# Patient Record
Sex: FEMALE
[De-identification: names, ages, dates, MRNs, and addresses within clinical notes are randomized per-mention and may not be internally consistent; named-entity substitution may affect disease eponyms.]

---

## 2019-04-03 ENCOUNTER — HOSPITAL ENCOUNTER (INPATIENT)
Dept: HOSPITAL 72 - SDSOVERFLO | Age: 69
LOS: 2 days | Discharge: HOME HEALTH | DRG: 748 | End: 2019-04-05
Payer: MEDICARE

## 2019-04-03 VITALS — DIASTOLIC BLOOD PRESSURE: 68 MMHG | SYSTOLIC BLOOD PRESSURE: 131 MMHG

## 2019-04-03 VITALS — DIASTOLIC BLOOD PRESSURE: 59 MMHG | SYSTOLIC BLOOD PRESSURE: 129 MMHG

## 2019-04-03 VITALS — SYSTOLIC BLOOD PRESSURE: 147 MMHG | DIASTOLIC BLOOD PRESSURE: 65 MMHG

## 2019-04-03 VITALS — SYSTOLIC BLOOD PRESSURE: 132 MMHG | DIASTOLIC BLOOD PRESSURE: 65 MMHG

## 2019-04-03 VITALS — DIASTOLIC BLOOD PRESSURE: 65 MMHG | SYSTOLIC BLOOD PRESSURE: 143 MMHG

## 2019-04-03 VITALS — SYSTOLIC BLOOD PRESSURE: 134 MMHG | DIASTOLIC BLOOD PRESSURE: 59 MMHG

## 2019-04-03 VITALS — DIASTOLIC BLOOD PRESSURE: 82 MMHG | SYSTOLIC BLOOD PRESSURE: 138 MMHG

## 2019-04-03 VITALS — DIASTOLIC BLOOD PRESSURE: 61 MMHG | SYSTOLIC BLOOD PRESSURE: 138 MMHG

## 2019-04-03 VITALS — DIASTOLIC BLOOD PRESSURE: 63 MMHG | SYSTOLIC BLOOD PRESSURE: 133 MMHG

## 2019-04-03 VITALS — BODY MASS INDEX: 29.88 KG/M2 | WEIGHT: 175 LBS | HEIGHT: 64 IN

## 2019-04-03 VITALS — SYSTOLIC BLOOD PRESSURE: 137 MMHG | DIASTOLIC BLOOD PRESSURE: 65 MMHG

## 2019-04-03 VITALS — SYSTOLIC BLOOD PRESSURE: 101 MMHG | DIASTOLIC BLOOD PRESSURE: 70 MMHG

## 2019-04-03 VITALS — SYSTOLIC BLOOD PRESSURE: 116 MMHG | DIASTOLIC BLOOD PRESSURE: 55 MMHG

## 2019-04-03 VITALS — SYSTOLIC BLOOD PRESSURE: 146 MMHG | DIASTOLIC BLOOD PRESSURE: 64 MMHG

## 2019-04-03 DIAGNOSIS — M79.7: ICD-10-CM

## 2019-04-03 DIAGNOSIS — M19.90: ICD-10-CM

## 2019-04-03 DIAGNOSIS — F32.9: ICD-10-CM

## 2019-04-03 DIAGNOSIS — I10: ICD-10-CM

## 2019-04-03 DIAGNOSIS — E03.9: ICD-10-CM

## 2019-04-03 DIAGNOSIS — E78.5: ICD-10-CM

## 2019-04-03 DIAGNOSIS — N81.10: Primary | ICD-10-CM

## 2019-04-03 DIAGNOSIS — N39.46: ICD-10-CM

## 2019-04-03 DIAGNOSIS — N81.6: ICD-10-CM

## 2019-04-03 LAB
ADD MANUAL DIFF: NO
ANION GAP SERPL CALC-SCNC: 8 MMOL/L (ref 5–15)
BASOPHILS NFR BLD AUTO: 0.8 % (ref 0–2)
BUN SERPL-MCNC: 15 MG/DL (ref 7–18)
CALCIUM SERPL-MCNC: 9.1 MG/DL (ref 8.5–10.1)
CHLORIDE SERPL-SCNC: 108 MMOL/L (ref 98–107)
CO2 SERPL-SCNC: 29 MMOL/L (ref 21–32)
CREAT SERPL-MCNC: 0.7 MG/DL (ref 0.55–1.3)
EOSINOPHIL NFR BLD AUTO: 1 % (ref 0–3)
ERYTHROCYTE [DISTWIDTH] IN BLOOD BY AUTOMATED COUNT: 11.6 % (ref 11.6–14.8)
HCT VFR BLD CALC: 37.2 % (ref 37–47)
HGB BLD-MCNC: 12.4 G/DL (ref 12–16)
LYMPHOCYTES NFR BLD AUTO: 25.8 % (ref 20–45)
MCV RBC AUTO: 92 FL (ref 80–99)
MONOCYTES NFR BLD AUTO: 7.8 % (ref 1–10)
NEUTROPHILS NFR BLD AUTO: 64.6 % (ref 45–75)
PLATELET # BLD: 218 K/UL (ref 150–450)
POTASSIUM SERPL-SCNC: 3.5 MMOL/L (ref 3.5–5.1)
RBC # BLD AUTO: 4.06 M/UL (ref 4.2–5.4)
SODIUM SERPL-SCNC: 145 MMOL/L (ref 136–145)
WBC # BLD AUTO: 6.9 K/UL (ref 4.8–10.8)

## 2019-04-03 PROCEDURE — 80048 BASIC METABOLIC PNL TOTAL CA: CPT

## 2019-04-03 PROCEDURE — 0JQC0ZZ REPAIR PELVIC REGION SUBCUTANEOUS TISSUE AND FASCIA, OPEN APPROACH: ICD-10-PCS

## 2019-04-03 PROCEDURE — 85025 COMPLETE CBC W/AUTO DIFF WBC: CPT

## 2019-04-03 PROCEDURE — 94003 VENT MGMT INPAT SUBQ DAY: CPT

## 2019-04-03 PROCEDURE — 36415 COLL VENOUS BLD VENIPUNCTURE: CPT

## 2019-04-03 PROCEDURE — 94150 VITAL CAPACITY TEST: CPT

## 2019-04-03 PROCEDURE — 87081 CULTURE SCREEN ONLY: CPT

## 2019-04-03 PROCEDURE — 0TSC0ZZ REPOSITION BLADDER NECK, OPEN APPROACH: ICD-10-PCS

## 2019-04-03 PROCEDURE — 0JUC0JZ SUPPLEMENT OF PELVIC REGION SUBCUTANEOUS TISSUE AND FASCIA WITH SYNTHETIC SUBSTITUTE, OPEN APPROACH: ICD-10-PCS

## 2019-04-03 PROCEDURE — 0USG0ZZ REPOSITION VAGINA, OPEN APPROACH: ICD-10-PCS

## 2019-04-03 RX ADMIN — DEXTROSE, SODIUM CHLORIDE, AND POTASSIUM CHLORIDE SCH MLS/HR: 5; .45; .15 INJECTION INTRAVENOUS at 16:13

## 2019-04-03 NOTE — PRE-PROCEDURE NOTE/ATTESTATION
Pre-Procedure Note/Attestation


Complete Prior to Procedure


Planned Procedure:  not applicable


Procedure Narrative:


cystocele rectocele repair vaginal sling cystoscopy





Indications for Procedure


Pre-Operative Diagnosis:


prolapse





Attestation


I attest that I discussed the nature of the procedure; its benefits; risks and 

complications; and alternatives (and the risks and benefits of such alternatives

), prior to the procedure, with the patient (or the patient's legal 

representative).





I attest that, if there was a reasonable possibility of needing a blood 

transfusion, the patient (or the patient's legal representative) was given the 

[facility] standardized written summary, pursuant 

to the Jose Roberto Jennifer Blood Safety Act ([place] Health and Safety Code # 1645, as 

amended).





I attest that I re-evaluated the patient just prior to the surgery and that 

there has been no change in the patient's H&P, except as documented below:











Angel Oneal MD Apr 3, 2019 11:11

## 2019-04-03 NOTE — ANETHESIA PREOPERATIVE EVAL
Anesthesia Pre-op PMH/ROS


General


Date of Evaluation:  Apr 3, 2019


Anesthesiologist:  [place]


ASA Score:  ASA 3


Mallampati Score


Class I : Soft palate, uvula, fauces, pillars visible


Class II: Soft palate, uvula, fauces visible


Class III: Soft palate, base of uvula visible


Class IV: Only hard plate visible


Mallampati Classification:  Class II


Surgeon:  Kimmy


Diagnosis:  Bladder prolapse


Surgical Procedure:  Cystoscopy, cystocele, vaginal sling


Anesthesia History:  none


Family History:  no anesthesia problems


Allergies:  


Coded Allergies:  


     PENICILLINS (Verified  Allergy, Intermediate, swelling; throat closes- up, 

4/2/19)


Medications:  see eMAR


Patient NPO?:  Yes


NPO Date:  Apr 2, 2019


NPO Time:  2000





Past Medical History


Cardiovascular:  Reports: HTN, arrhythmia, other - HLD; 


   Denies: CAD, MI, valve dz


Pulmonary:  Denies: asthma, COPD, NICOLASA, other


Gastrointestinal/Genitourinary:  Reports: GERD, other - bladder prolapse; 


   Denies: CRI, ESRD


Neurologic/Psychiatric:  Reports: CVA; 


   Denies: dementia, depression/anxiety, TIA, other


Endocrine:  Reports: hypothyroidism; 


   Denies: DM, steroids, other


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 


   Denies: cataract (L), cataract (R), glaucoma, [place] (L), [place] (R)


Hematology/Immune:  Denies: anemia, DVT, bleeding disorder, other


Musculoskeletal/Integumentary:  Reports: OA; 


   Denies: RA, DJD, DDD, edema, other


Other:  obesity


PSxH Narrative:


lap appy





Anesthesia Pre-op Phys. Exam


Physician Exam





Last Vital Signs








  Date Time  Temp Pulse Resp B/P (MAP) Pulse Ox O2 Delivery O2 Flow Rate FiO2


 


4/3/19 09:30      Room Air  


 


4/3/19 09:01 98.7 64 18 138/82 (100) 99   








Constitutional:  NAD


Cardiovascular:  RRR


Respiratory:  CTA





Airway Exam


Mallampati Score:  Class II


MO:  full


ROM:  full


Dentures:  upper, lower





Anesthesia Pre-op A/P


Labs


see chart





Studies


Pre-op Studies:  EKG - sr





Risk Assessment & Plan


Assessment:


ASA III


Plan:


GA


Status Change Before Surgery:  No





Pre-Antibiotics


Drug:  Lizzy Man MD Apr 3, 2019 10:24

## 2019-04-03 NOTE — BRIEF OPERATIVE NOTE
Immediate Post Operative Note


Operative Note


Pre-op Diagnosis:


prolapse


Procedure:


cystocele and rectocele reapir vaginal sling cystoscopy


Post-op Diagnosis:


same


Post-op Diagnosis:  same as pre-op


Surgeon:  Alex Oneal


Anesthesia:  general


Specimen:  none


Complications:  none


Condition:  stable


Fluids:  1000


Estimated Blood Loss:  minimal


Drains:  none


Implant(s) used?:  No











Angel Oneal MD Apr 3, 2019 12:41

## 2019-04-03 NOTE — IMMEDIATE POST-OP EVALUATION
Immediate Post-Op Evalulation


Immediate Post-Op Evalulation


Procedure:  Vaginal sling, cystocele repair, rectocele repair, cystoscopy


Date of Evaluation:  Apr 3, 2019


Time of Evaluation:  12:48


IV Fluids:  800


Blood Products:  0


Estimated Blood Loss:  min


Urinary Output:  0


Blood Pressure Systolic:  147


Blood Pressure Diastolic:  65


Pulse Rate:  58


Respiratory Rate:  16


O2 Sat by Pulse Oximetry:  100


Temperature (Fahrenheit):  97.5


Pain Score (1-10):  0


Nausea:  No


Vomiting:  No


Complications


0


Patient Status:  awake, reacts, patent, none


Hydration Status:  adequate


Drug:  Clindamycin 600mg


Given Within 1 Hr of Incision:  Yes











Lizzy Dow MD Apr 3, 2019 12:47

## 2019-04-03 NOTE — NUR
NURSE NOTES:



Report taken from RAJANI Larson. Patient is awake and in bed, A&Ox4, family at bedside. No signs 
of distress on room air. IV site c/d/i and patent, D5 1/2 NS +20KCl at 100 ml/hr runnings, 
will discharge IV once patient able to intake 500cc fluids PO. MD left order to contact 
within certain parameters of BP, Temp, and UO, continue to monitor. No skin issues present. 
Surgical site dressing c/d/i, no excessive drainage. Urban c/d/i and flowing light blue 
urine, color due to fluid given during procedure. Bed in lowest position, call light within 
reach.

## 2019-04-03 NOTE — NUR
NURSE NOTES:

Received report from RAJANI Crocker. Pt in bed, resting, A/Ox4, no complaints of pain, assessment 
done, see flowsheet, no apparent bleeding noted, Urban in place and draining, vitals 
obtained and stable, will continue vitals per unit protocol for post-op pt. Family at 
bedside, bed in lowest position, call light within reach.

## 2019-04-04 VITALS — DIASTOLIC BLOOD PRESSURE: 54 MMHG | SYSTOLIC BLOOD PRESSURE: 105 MMHG

## 2019-04-04 VITALS — SYSTOLIC BLOOD PRESSURE: 109 MMHG | DIASTOLIC BLOOD PRESSURE: 49 MMHG

## 2019-04-04 VITALS — SYSTOLIC BLOOD PRESSURE: 109 MMHG | DIASTOLIC BLOOD PRESSURE: 70 MMHG

## 2019-04-04 VITALS — DIASTOLIC BLOOD PRESSURE: 46 MMHG | SYSTOLIC BLOOD PRESSURE: 87 MMHG

## 2019-04-04 VITALS — DIASTOLIC BLOOD PRESSURE: 48 MMHG | SYSTOLIC BLOOD PRESSURE: 101 MMHG

## 2019-04-04 VITALS — SYSTOLIC BLOOD PRESSURE: 107 MMHG | DIASTOLIC BLOOD PRESSURE: 72 MMHG

## 2019-04-04 LAB
ADD MANUAL DIFF: NO
ANION GAP SERPL CALC-SCNC: 8 MMOL/L (ref 5–15)
BASOPHILS NFR BLD AUTO: 0.4 % (ref 0–2)
BUN SERPL-MCNC: 10 MG/DL (ref 7–18)
CALCIUM SERPL-MCNC: 8.1 MG/DL (ref 8.5–10.1)
CHLORIDE SERPL-SCNC: 107 MMOL/L (ref 98–107)
CO2 SERPL-SCNC: 28 MMOL/L (ref 21–32)
CREAT SERPL-MCNC: 0.8 MG/DL (ref 0.55–1.3)
EOSINOPHIL NFR BLD AUTO: 0.4 % (ref 0–3)
ERYTHROCYTE [DISTWIDTH] IN BLOOD BY AUTOMATED COUNT: 11.7 % (ref 11.6–14.8)
HCT VFR BLD CALC: 30.1 % (ref 37–47)
HGB BLD-MCNC: 10 G/DL (ref 12–16)
LYMPHOCYTES NFR BLD AUTO: 17.4 % (ref 20–45)
MCV RBC AUTO: 92 FL (ref 80–99)
MONOCYTES NFR BLD AUTO: 9.4 % (ref 1–10)
NEUTROPHILS NFR BLD AUTO: 72.3 % (ref 45–75)
PLATELET # BLD: 193 K/UL (ref 150–450)
POTASSIUM SERPL-SCNC: 3.9 MMOL/L (ref 3.5–5.1)
RBC # BLD AUTO: 3.28 M/UL (ref 4.2–5.4)
SODIUM SERPL-SCNC: 143 MMOL/L (ref 136–145)
WBC # BLD AUTO: 8.2 K/UL (ref 4.8–10.8)

## 2019-04-04 RX ADMIN — DEXTROSE, SODIUM CHLORIDE, AND POTASSIUM CHLORIDE SCH MLS/HR: 5; .45; .15 INJECTION INTRAVENOUS at 02:11

## 2019-04-04 NOTE — NUR
HAND-OFF: 

Report given to RAJANI Beth. Surgical packing removed per MD order. Patient awake and stable.

## 2019-04-04 NOTE — NUR
NURSE NOTES:

During shift change patient alert awake with out no distress FC in place draining pale 
yellow urine, patient eating breakfast, bed on low position  locked, call light with in 
reach, SCD on, reported pain 4/10 in lower abdomen, patient also reported the pain was 
stronger but after the packing is removed pain decreased. will continue to monitor.

## 2019-04-04 NOTE — NUR
NURSE NOTES:

Pt. attempt to walk x3, once with PT , x2 with RN complained of light headedness and BP 
dropped when patient stood up MD notified discharge canceled, ordered medication refilled. 
encouraged fluid intake will continue to monitor.

## 2019-04-04 NOTE — NUR
**DISCHARGE PLANNING



PATIENT HAS BEEN REFERRED TO

[facility]

T: [phone]

F: [phone]

## 2019-04-04 NOTE — NUR
PT EVALUATION NOTE

Patient seen for initial evaluation,  please see evaluation for details. Patient presents 
with generalized weakness and impaired mobility. Patient will benefit from skilled inpatient 
PT intervention to address strength, balance, endurance, safety 

and functional mobility to return to prior level of function. Anticipate discharge home with 
family assistance once cleared by MD. Do not anticipate any DME needs. 


-------------------------------------------------------------------------------

Addendum: 04/04/19 at 1301 by BREONNA CROSS PT

-------------------------------------------------------------------------------

Amended: Links added.

## 2019-04-04 NOTE — 48 HOUR POST ANESTHESIA EVAL
Post Anesthesia Evaluation


Procedure:  Vaginal sling, cystocele repair, rectocele repair, cystoscopy


Date of Evaluation:  Apr 4, 2019


Time of Evaluation:  11:02


Blood Pressure Systolic:  108


0:  56


Pulse Rate:  72


Respiratory Rate:  20


Temperature (Fahrenheit):  97.6


O2 Sat by Pulse Oximetry:  98


Airway:  patent


Nausea:  No


Vomiting:  No


Pain Intensity:  1


Hydration Status:  adequate


Cardiopulmonary Status:


stable


Mental Status/LOC:  patient returned to baseline


Follow-up Care/Observations:


n/a


Post-Anesthesia Complications:


none


Follow-up care needed:  ready to discharge











Jacob Del Cid MD Apr 4, 2019 11:03

## 2019-04-04 NOTE — NUR
CASE MANAGEMENT:REVIEW



4/3/19

68 YR OLD FEMALE HERE FOR ELECTIVE SURGERY



SI: PROLAPSED BLADDER

98.7  64  18  138/82  99% ON RA



IS; TO SURGERY FOR:

CYSTOCELE & RECTOCELE REPAIR VAGINAL SLING CYSTOSCOPY

**: TO MED/SURG 3 EAST POST OP



**INTERQUAL CRITERIA MET

## 2019-04-04 NOTE — OPERATIVE NOTE - DICTATED
DATE OF OPERATION:  04/03/2019

PREOPERATIVE DIAGNOSIS:  Vaginal prolapse.



POSTOPERATIVE DIAGNOSIS:  Vaginal prolapse.



OPERATIONS:  Transvaginal cystocele repair, rectocele repair, vaginal wall

sling, and cystoscopy.



OPERATED BY:  Angel Oneal M.D.



ANESTHESIA:  General.



FINDINGS:  Total vaginal prolapse.



INDICATIONS FOR SURGERY:  The patient developed stress and urge

incontinence as well as vaginal anterior and posterior prolapse.

Treatment options were explained to her in great length including all

potential complications and she signed the consent.



DESCRIPTION OF PROCEDURE:  She was brought to the operating room, placed in

lithotomy position, and prepped and draped in standard fashion.  Under

general anesthesia, goalpost midline incision was made and bladder was

 from the vaginal mucosa and dissected all the way to the

retrovesical space.  Retrovesical space was entered, identified

sacral-spinal muscles.  Using elevated anterior device made by [employer], bladder was placed in the sacrospinous ligaments and secured

to the cervix from the uterus and to the lateral sides of the vaginal wall

creating a nice Hammock suspension of the bladder.  After that, mid

urethral device was placed using MiniArc and secured in the mid urethra.

Vagina was closed over the bladder and then formal rectocele repair with

interrupted 0 Vicryl sutures was done supporting posterior vaginal wall.

Cystoscopy was done showing no evidence of bladder perforation.  Both

ureteral orifices were intact.  Urban catheter was placed.  Vaginal

packing.  Sponge count and instrument count were correct.  The patient

tolerated the procedure well.  No evidence of complications.









  ______________________________________________

  Angel Oneal M.D.





DR:  ENDY

D:  04/04/2019 15:24

T:  04/04/2019 21:31

JOB#:  [id number]

CC:

## 2019-04-04 NOTE — HISTORY AND PHYSICAL REPORT
DATE OF ADMISSION:  04/03/2019

HISTORY OF PRESENT ILLNESS:  This is a very pleasant 68-year-old female who

has undergone bladder surgery yesterday by Dr. Angel Oneal.  She is

postop day #1.  She is feeling comfortable.  A Urban catheter is in place.

There has been no overnight issues.



PAST MEDICAL HISTORY:  Hypertension, hypothyroidism, hyperlipidemia,

osteoarthrosis, obesity, depression, fibromyalgia, previous right eye

optic neuropathy.



SURGERIES:  None.



HOME MEDICATIONS:  Include pravastatin, Linzess, aspirin, Synthroid,

omeprazole, Lasix, and Naprosyn.



SOCIAL HISTORY:  No history of alcohol or tobacco usage.



REVIEW OF SYSTEMS:  Denies any headaches, hematemesis, melena,

hematochezia, or weight loss.



PHYSICAL EXAMINATION:

GENERAL:  Reveals a 68-year-old female.

VITAL SIGNS:  Blood pressure 130/70, heart rate _____, she is afebrile.

HEENT:  Unremarkable.

LUNGS:  Clear breath sounds bilaterally.

ABDOMEN:  Soft.

NEUROLOGIC:  Nonfocal.



LABORATORY DATA:  Lab testing overnight showed normal CBC and BMP.

Hemoglobin is 12.



IMPRESSION:

1. Postop day #1, status post bladder surgery.

2. Hypertension.

3. Hypothyroidism.

4. Depression.

5. Fibromyalgia.



DISCUSSION:  The patient cleared for discharge.  I will write a

prescription for Levaquin that she can take.  Urban will be left in place.

We will order home health for PT and Urban care.  Outpatient follow up.









  ______________________________________________

  Live Wagoner M.D.





DR:  SUJEY

D:  04/04/2019 08:39

T:  04/04/2019 17:50

JOB#:  [id number]

CC:

## 2019-04-05 VITALS — DIASTOLIC BLOOD PRESSURE: 69 MMHG | SYSTOLIC BLOOD PRESSURE: 118 MMHG

## 2019-04-05 VITALS — DIASTOLIC BLOOD PRESSURE: 54 MMHG | SYSTOLIC BLOOD PRESSURE: 121 MMHG

## 2019-04-05 VITALS — DIASTOLIC BLOOD PRESSURE: 63 MMHG | SYSTOLIC BLOOD PRESSURE: 101 MMHG

## 2019-04-05 VITALS — DIASTOLIC BLOOD PRESSURE: 61 MMHG | SYSTOLIC BLOOD PRESSURE: 105 MMHG

## 2019-04-05 VITALS — SYSTOLIC BLOOD PRESSURE: 103 MMHG | DIASTOLIC BLOOD PRESSURE: 58 MMHG

## 2019-04-05 NOTE — NUR
NURSE NOTES:

Patient discharged from the unit stable condition, had a low grad fever and Tylenol given T. 
decreased to 99.4, during discharge patient accompanied by daughter and spouse. discharge 
instruction including catheter care provided fro patient and daughters, the FC changed to 
leg bag as ordered. Patient had appointment with Dr. monk Monday for follow up.  nurse 
called and contacted family will visit patient tomorrow. transported from unit to private 
vehicle with wheelchair transferred to the [facility]. Measuring cup for the urine given.

## 2019-04-05 NOTE — NUR
NURSE NOTES:

During shift change patient alert awake with out no distress call light with in reach bed on 
low position locked seating on bed eating breakfast FC intact draining well.

## 2019-04-05 NOTE — PULMONOLOGY PROGRESS NOTE
Assessment/Plan


Assessment/Plan


IMPRESSION:


1. Postop day #2, status post bladder surgery.


2. Hypertension.


3. Hypothyroidism.


4. Depression.


5. Fibromyalgia.





DISCUSSION:  The patient is cleared for discharge.  Held yesterday due to 

weakness and hypotension.


Dc home on Levaquin that she can take.  Urban will be left in place.


I will order home health for PT and Urban care.  Outpatient follow up.





Subjective


Interval Events:  BP better today; feeling better


Constitutional:  Reports: no symptoms


HEENT:  Repors: no symptoms


Respiratory:  Reports: no symptoms


Cardiovascular:  Reports: no symptoms


Gastrointestinal/Abdominal:  Reports: no symptoms


Genitourinary:  Reports: no symptoms


Allergies:  


Coded Allergies:  


     PENICILLINS (Verified  Allergy, Intermediate, swelling; throat closes- up, 

4/2/19)





Objective





Last 24 Hour Vital Signs








  Date Time  Temp Pulse Resp B/P (MAP) Pulse Ox O2 Delivery O2 Flow Rate FiO2


 


4/5/19 04:00 99.0 76 18 105/61 (76) 96   


 


4/5/19 00:00 99.6 88 20 121/54 (76) 96   


 


4/4/19 21:00 99.7 90 18 109/70 (83) 98   


 


4/4/19 21:00      Nasal Cannula 2.0 


 


4/4/19 20:31 99.5       


 


4/4/19 20:00 101.3 92 20 107/72 (84) 98   


 


4/4/19 16:00 99.6 83 20 109/49 (69) 98   


 


4/4/19 12:00 99.8 85 20 101/48 (65) 98   


 


4/4/19 11:03  72 20  98   


 


4/4/19 10:52 97.7       


 


4/4/19 09:00      Nasal Cannula 2.0 


 


4/4/19 08:45 97.7 70 20 105/54 (71) 98   

















Intake and Output  


 


 4/4/19 4/5/19





 18:59 06:59


 


Intake Total 980 ml 


 


Output Total 950 ml 1700 ml


 


Balance 30 ml -1700 ml


 


  


 


Intake Oral 980 ml 


 


Output Urine Total 950 ml 1700 ml








General Appearance:  no acute distress


HEENT:  normocephalic


Respiratory/Chest:  chest wall non-tender, lungs clear


Cardiovascular:  normal peripheral pulses, normal rate


Abdomen:  normal bowel sounds





Current Medications








 Medications


  (Trade)  Dose


 Ordered  Sig/Jose Manuel


 Route


 PRN Reason  Start Time


 Stop Time Status Last Admin


Dose Admin


 


 Acetaminophen


  (Tylenol)  650 mg  Q4H  PRN


 ORAL


 FEVER  4/3/19 15:02


 5/3/19 15:01  4/4/19 20:01


 


 


 Acetaminophen


  (Tylenol)  650 mg  Q6H  PRN


 ORAL


 Mild Pain (Pain Scale 1-3)  4/3/19 15:01


 5/3/19 15:00  4/4/19 08:57


 


 


 Acetaminophen/


 Hydrocodone Bitart


  (Norco 5/325)  1 tab  Q4H  PRN


 ORAL


 Moderate Pain (Pain Scale 4-6)  4/3/19 12:45


 4/10/19 12:44   


 


 


 Hydromorphone HCl


  (Dilaudid)  1 mg  Q3H  PRN


 IVP


 pain score 4-6  4/3/19 15:01


 4/10/19 15:00  4/3/19 15:22


 


 


 Ketorolac


 Tromethamine


  (Toradol 30mg)  15 mg  Q6H  PRN


 IV


 For Pain  4/3/19 15:01


 4/8/19 15:00   


 


 


 Levofloxacin  100 ml @ 


 100 mls/hr  Q24H


 IVPB


   4/3/19 17:00


 4/10/19 16:59  4/4/19 17:21


 


 


 Ondansetron HCl


  (Zofran)  4 mg  Q6H  PRN


 IVP


 Nausea & Vomiting  4/3/19 12:45


 5/3/19 12:44  4/3/19 16:12


 


 


 Temazepam


  (Restoril)  7.5 mg  DAILYPRN  PRN


 ORAL


 Insomnia  4/3/19 15:02


 4/10/19 15:01  4/4/19 22:01


 

















Live Wagoner MD Apr 5, 2019 08:11

## 2019-04-05 NOTE — NUR
NURSE NOTES:

Patient able to ambulate x2 once with PT and once with RN family notified of discharge 
clearance and in the unit discharge teaching will be provided and will be DS as ordered by 
MD.

## 2019-04-05 NOTE — NUR
HAND-OFF: 

Report given to RAJANI Beth. Patient awake and hopeful to go home today. UO overnight was 
1700cc. Patient in stable condition.

## 2019-04-08 NOTE — DISCHARGE SUMMARY
Discharge Summary


Hospital Course


Date of Admission


Apr 3, 2019 at 08:16


Date of Discharge


Apr 5, 2019 at 18:20


Admitting Diagnosis


total vaginal prolapse 





Reason for Hospitalization:  elective surgery


MARTHA Aguirre is a 68 year old female who was admitted on Apr 3, 2019 at 08:

16 for total vaginal prolapse, 


The patient developed stress and urge incontinence as well as vaginal anterior 

and posterior prolapse.


Treatment options were explained to her in great length,  including all 

potential complications and she  opted for surgery and signed the consent.


Consultations


dr Wagoner - IM


Procedures


s/p 4/3/19 by dr Oneal


Transvaginal cystocele repair, rectocele repair, vaginal wall sling, and 

cystoscopy.


Hospital Course


status post surgery 


course of recovery uneventful 


initially IV fluids


antibiotics


pain management was addressed , and pain was controlled 


hemodynamically stable 


ambulated   freely 


use of incentive spirometry was  encouraged while in the bed 


fall precautions maintained;  safe for ambulation 


tolerated diet , IV fluids discontinued 


GI prophylaxis provided 


antiemetics were on board as needed 


blood pressure was closely monitored , remained stable  


Urban catheter continued


patient was instructed on Urban care


bowel regimen instituted 


patient was stable for discharge 


discharge instructions provided 


follow up with surgeon  next week as arranged


home health services were arranged for PT services and Urban care


continue Levaquin as ordered





FINAL DIAGNOSES


total vaginal prolapse


s/p transvaginal cystocele repair, rectocele repair, vaginal wall sling, and 

cystoscopy 


HTN


Hypothyroidism 


Depression 


Fibromyalgia





Discharge Medications


New Medications:  


Levofloxacin* (Levaquin*) 500 Mg Tablet


500 MG ORAL DAILY for 7 Days, TAB





 


Continued Medications:  


Aspirin* (Aspirin*) 81 Mg Tab.chew


81 MG ORAL DAILY, TAB (This prescription has been renewed)





Calcium Carbonate/Vitamin D3 (Oyster Shell 500-Vit D3 200 Pk) 1 Each Powd.pack


1 EACH PO DAILY, PACK (This prescription has been renewed)





Dronedarone Hydrochloride (Multaq) 400 Mg Tablet


400 MG ORAL DAILY, TAB 0 Refills (This prescription has been renewed)





Levothyroxine Sodium* (Synthroid*) 25 Mcg Tablet


50 MCG ORAL DAILY, TAB (This prescription has been renewed)


Take in the morning on an empty stomach, at least 30 minutes before


 food.


Linaclotide (Linzess) 145 Mcg Capsule


145 MCG PO DAILY, CAP (This prescription has been renewed)





Losartan/Hydrochlorothiazide (Losartan-Hctz 100-25 Mg Tab) 1 Each Tablet


1 TAB ORAL DAILY, TAB (This prescription has been renewed)





Metoprolol Succinate* (Metoprolol Succinate*) 50 Mg Tab.er.24h


50 MG ORAL DAILY, TAB (This prescription has been renewed)





Naproxen (Naproxen) 250 Mg Tablet


250 MG PO PRN, TAB (This prescription has been renewed)





Omeprazole (Omeprazole) 20 Mg Tablet.dr


20 MG ORAL DAILY, TAB (This prescription has been renewed)





Pravastatin Sod (Pravastatin Sod) 40 Mg Tablet


40 MG ORAL BEDTIME, TAB (This prescription has been renewed)











Discharge


Condition Upon Discharge:  stable


Discharge Disposition


Patient was discharged to Home with Home Health(06)





Discharge Instructions


Discharge Instructions


Special Instructions


I have been assigned to complete a D/C Summary on this account. I was not 

involved in the patient management











Becca Randolph NP Apr 8, 2019 14:20

## 2020-07-11 NOTE — NUR
HAND-OFF: 

Report given to RAJANI Talley patient stable condition awake alert with out no distress, FC in 
place draining well, call light with in reach bed lower position locked, endorsed to 
RAJANI Talley regarding oral Levaquin refilled and in pharmacy. No